# Patient Record
Sex: MALE | Race: WHITE | Employment: OTHER
[De-identification: names, ages, dates, MRNs, and addresses within clinical notes are randomized per-mention and may not be internally consistent; named-entity substitution may affect disease eponyms.]

---

## 2022-04-30 ENCOUNTER — NURSE TRIAGE (OUTPATIENT)
Dept: OTHER | Facility: CLINIC | Age: 72
End: 2022-04-30

## 2022-04-30 NOTE — TELEPHONE ENCOUNTER
Received call from [facility]  PHILIP at [facility] with [facility]. Subjective: Caller states \"SOB\"     Current Symptoms: Patient reported that he been told he \"breathes heavy\" for several months. Just got back home from 4 months in [facility]. Now is noticing increased SOB. States is able to ride his bike but gets SOB with walking or climbing stairs. Put over 800 miles on bike in the past 4 months without dyspnea. Onset: several months ago; gradual    Associated Symptoms: NA    Pain Severity: 0/10; ;     Temperature: denies     What has been tried: nothing    LMP: NA Pregnant: NA    Recommended disposition: See PCP within 3 Days    Care advice provided, patient verbalizes understanding; denies any other questions or concerns; instructed to call back for any new or worsening symptoms. Patient/Caller agrees with recommended disposition; writer provided warm transfer to [facility] at [facility] for appointment scheduling     Attention Provider: Thank you for allowing me to participate in the care of your patient. The patient was connected to triage in response to information provided to the ECC/PSC. Please do not respond through this encounter as the response is not directed to a shared pool.           Reason for Disposition   [1] MODERATE longstanding difficulty breathing (e.g., speaks in phrases, SOB even at rest, pulse 100-120) AND [2] SAME as normal    Protocols used: BREATHING DIFFICULTY-ADULT-AH

## 2022-05-03 ENCOUNTER — TELEPHONE (OUTPATIENT)
Dept: FAMILY MEDICINE CLINIC | Age: 72
End: 2022-05-03

## 2022-05-03 NOTE — TELEPHONE ENCOUNTER
----- Message from Mingo Landa sent at 4/30/2022 12:37 PM EDT -----  Subject: Message to Provider    QUESTIONS  Information for Provider? Patient is calling because he would like to   become a new patient to Dr. Fawn Flowers. No available appointment with   provider. Please contact patient. Please advise  ---------------------------------------------------------------------------  --------------  CALL BACK INFO  What is the best way for the office to contact you? OK to leave message on   voicemail  Preferred Call Back Phone Number? [phone]  ---------------------------------------------------------------------------  --------------  SCRIPT ANSWERS  Relationship to Patient?  Self

## 2022-05-25 ENCOUNTER — NURSE TRIAGE (OUTPATIENT)
Dept: OTHER | Facility: CLINIC | Age: 72
End: 2022-05-25

## 2022-05-25 NOTE — TELEPHONE ENCOUNTER
Received call from [facility] at [facility]; Patient with Red Flag Complaint requesting to establish care with [address] with Dr. Mele Rios. Subjective: Caller states \"Patient states people have been telling me I have been breathing heavy. Patient states recent difficulty walking up stairs. Patient states he went to the [facility] about a month ago, states they ran a bunch of tests, saw Cardiology, they wanted to admit, patient did not want to be admitted. States he was tested sleep apnea, does have sleep apnea and waiting on CPAP\". Also noted, dx with Afib during ED visit. Current Symptoms:  Intermittent SOB, occurs with walking > than a half a mile. Patient denies any current CP, SOB, or difficulty breathing. Onset: 1 year ago; gradual; worsening over the past year    Associated Symptoms: difficulty sleeping due sleep apne    Pain Severity: Denies any pain     Temperature: NA Denies feeling feverish    What has been tried: BP meds, eliquis (not taking), cholesterol    LMP: NA Pregnant: NA    Recommended disposition: Go to Office Now    Care advice provided, patient verbalizes understanding; denies any other questions or concerns; instructed to call back for any new or worsening symptoms. Patient/Caller agrees with recommended disposition; writer provided warm transfer to [place] at [facility] for appointment scheduling    Attention Provider: Thank you for allowing me to participate in the care of your patient. The patient was connected to triage in response to information provided to the [facility]. Please do not respond through this encounter as the response is not directed to a shared pool.       Reason for Disposition   Patient wants to be seen    Protocols used: BREATHING DIFFICULTY-ADULT-OH

## 2022-06-03 ENCOUNTER — OFFICE VISIT (OUTPATIENT)
Dept: INTERNAL MEDICINE CLINIC | Age: 72
End: 2022-06-03
Payer: COMMERCIAL

## 2022-06-03 VITALS
DIASTOLIC BLOOD PRESSURE: 82 MMHG | BODY MASS INDEX: 31.97 KG/M2 | HEIGHT: 72 IN | WEIGHT: 236 LBS | SYSTOLIC BLOOD PRESSURE: 120 MMHG

## 2022-06-03 DIAGNOSIS — G47.33 OSA (OBSTRUCTIVE SLEEP APNEA): ICD-10-CM

## 2022-06-03 DIAGNOSIS — I48.0 PAROXYSMAL ATRIAL FIBRILLATION (HCC): ICD-10-CM

## 2022-06-03 DIAGNOSIS — I27.20 PULMONARY HYPERTENSION (HCC): ICD-10-CM

## 2022-06-03 DIAGNOSIS — I10 PRIMARY HYPERTENSION: Primary | ICD-10-CM

## 2022-06-03 PROCEDURE — 99204 OFFICE O/P NEW MOD 45 MIN: CPT | Performed by: INTERNAL MEDICINE

## 2022-06-03 PROCEDURE — 1123F ACP DISCUSS/DSCN MKR DOCD: CPT | Performed by: INTERNAL MEDICINE

## 2022-06-03 RX ORDER — ATORVASTATIN CALCIUM 20 MG/1
TABLET, FILM COATED ORAL
COMMUNITY
Start: 2022-05-03

## 2022-06-03 RX ORDER — FUROSEMIDE 40 MG/1
40 TABLET ORAL
Qty: 12 TABLET | Refills: 3 | Status: SHIPPED | OUTPATIENT
Start: 2022-06-03 | End: 2022-07-03

## 2022-06-03 RX ORDER — LOSARTAN POTASSIUM 100 MG/1
TABLET ORAL
COMMUNITY
Start: 2021-12-08

## 2022-06-03 RX ORDER — METOPROLOL SUCCINATE 25 MG/1
25 TABLET, EXTENDED RELEASE ORAL DAILY
COMMUNITY
Start: 2022-05-24

## 2022-06-03 SDOH — ECONOMIC STABILITY: FOOD INSECURITY: WITHIN THE PAST 12 MONTHS, THE FOOD YOU BOUGHT JUST DIDN'T LAST AND YOU DIDN'T HAVE MONEY TO GET MORE.: NEVER TRUE

## 2022-06-03 SDOH — ECONOMIC STABILITY: FOOD INSECURITY: WITHIN THE PAST 12 MONTHS, YOU WORRIED THAT YOUR FOOD WOULD RUN OUT BEFORE YOU GOT MONEY TO BUY MORE.: NEVER TRUE

## 2022-06-03 SDOH — ECONOMIC STABILITY: TRANSPORTATION INSECURITY
IN THE PAST 12 MONTHS, HAS LACK OF TRANSPORTATION KEPT YOU FROM MEETINGS, WORK, OR FROM GETTING THINGS NEEDED FOR DAILY LIVING?: NO

## 2022-06-03 SDOH — ECONOMIC STABILITY: TRANSPORTATION INSECURITY
IN THE PAST 12 MONTHS, HAS THE LACK OF TRANSPORTATION KEPT YOU FROM MEDICAL APPOINTMENTS OR FROM GETTING MEDICATIONS?: NO

## 2022-06-03 ASSESSMENT — SOCIAL DETERMINANTS OF HEALTH (SDOH): HOW HARD IS IT FOR YOU TO PAY FOR THE VERY BASICS LIKE FOOD, HOUSING, MEDICAL CARE, AND HEATING?: NOT VERY HARD

## 2022-06-03 ASSESSMENT — PATIENT HEALTH QUESTIONNAIRE - PHQ9
SUM OF ALL RESPONSES TO PHQ QUESTIONS 1-9: 0
2. FEELING DOWN, DEPRESSED OR HOPELESS: 0
SUM OF ALL RESPONSES TO PHQ QUESTIONS 1-9: 0
SUM OF ALL RESPONSES TO PHQ9 QUESTIONS 1 & 2: 0
1. LITTLE INTEREST OR PLEASURE IN DOING THINGS: 0
SUM OF ALL RESPONSES TO PHQ QUESTIONS 1-9: 0
SUM OF ALL RESPONSES TO PHQ QUESTIONS 1-9: 0

## 2022-06-03 NOTE — PROGRESS NOTES
Subjective:      Patient ID: Padmini Veliz is a 70 y.o. male. The patient states that he was recently seen at [place] in [facility] . Presented with atrial fibrillation with rapid ventricular response but subsequent work-up showed that echocardiogram was normal he was started on metoprolol and Eliquis. His main complaint has been over the past 2 years dyspnea on exertion to be noted that he was found to have normal left ventricular size and function. He also had sleep study done which showed evidence of obstructive sleep apnea. He is a non-smoker and it is my impression that he has a pulmonary hypertension secondary to obstructive sleep apnea and that is causing dyspnea on exertion. Review of Systems   Constitutional: Positive for fatigue. HENT: Negative. Eyes: Negative. Respiratory: Positive for shortness of breath. Cardiovascular: Positive for palpitations. Gastrointestinal: Negative. Endocrine: Negative. Genitourinary: Negative. Musculoskeletal: Negative. Skin: Negative. Allergic/Immunologic: Negative. Hematological: Negative. Psychiatric/Behavioral: Negative. Objective:   Physical Exam  Constitutional:       Appearance: Normal appearance. Neck:      Vascular: No carotid bruit. Cardiovascular:      Rate and Rhythm: Normal rate and regular rhythm. Heart sounds: Murmur heard. Comments: A few years ago he was found to have dilated aorta and low intensity diastolic murmur suggesting of aortic regurgitation  Lymphadenopathy:      Cervical: No cervical adenopathy. Neurological:      General: No focal deficit present. Mental Status: He is alert and oriented to person, place, and time. Psychiatric:         Mood and Affect: Mood normal.         Behavior: Behavior normal.         Thought Content: Thought content normal.         Judgment: Judgment normal.         Assessment / Plan:       Diagnosis Orders   1.  Primary hypertension -blood pressure is well controlled with losartan and beta-blockers    2. Paroxysmal atrial fibrillation (HCC) = present he is in normal sinus rhythm      3. JORGE (obstructive sleep apnea) = is my impression that his dyspnea is secondary to obstructive sleep apnea he was advised CPAP. 1 impression at he is volume expanded he was given loop diuretic 40 mg 3 times per week and I will recheck him in 4 weeks furosemide (LASIX) 40 MG tablet    DME Order for CPAP as OP   4. Pulmonary hypertension ([address]     Records from [facility] in [place] were reviewed showed normal left ventricular ejection fraction and size and there was no evidence of left ventricular strain it is to be noted that this is a transthoracic echocardiogram and there was no complete evaluation of his valves  Return in about 4 weeks (around 7/1/2022) for Follow Up. Orders Placed This Encounter   Procedures    DME Order for CPAP as OP     {CHP DME Discharge  DX JORGE    Heated Humidifier    Heated Tubing with Integrated Element 1 per 3 months    Nasal Mask 1 per 3 months and Cushions/Seals 2 per month    Headgear 1 per 6 months, Chin Strap 1 per 6 months, Disposable Filters 2 per month, Non-disposable Filters 1 per 6 months, Chambers 1 per 6 months and Other Related Supplies. Replace supplies and accessories as needed. Patient may choose another interface for compliance and comfort. Comments: Symptomatic with dyspnea  Diagnosis: Obstructive sleep apnea  Length of need: 12 Months     Orders Placed This Encounter   Medications    furosemide (LASIX) 40 MG tablet     Sig: Take 1 tablet by mouth three times a week     Dispense:  12 tablet     Refill:  3     Visit Information    Have you changed or started any medications since your last visit including any over-the-counter medicines, vitamins, or herbal medicines? no   Are you having any side effects from any of your medications? -  no  Have you stopped taking any of your medications?  Is so, why? -  no    Have you seen any other physician or provider since your last visit? Yes - Records Obtained  Have you had any other diagnostic tests since your last visit? Yes - Records Obtained  Have you been seen in the emergency room and/or had an admission to a hospital since we last saw you? Yes - Records Obtained  Have you had your routine dental cleaning in the past 6 months? no    Have you activated your [employer] account? If not, what are your barriers?  No:      Patient Care Team:  Radha Santillan MD as PCP - General (Internal Medicine)    Medical History Review  Past Medical, Family, and Social History reviewed and does not contribute to the patient presenting condition    Health Maintenance   Topic Date Due    COVID-19 Vaccine (1) Never done    Hepatitis C screen  Never done    DTaP/Tdap/Td vaccine (1 - Tdap) Never done    Colorectal Cancer Screen  Never done    Shingles vaccine (1 of 2) Never done    Flu vaccine (Season Ended) 09/01/2022    Lipids  05/26/2023    Depression Screen  06/03/2023    Pneumococcal 65+ years Vaccine  Completed    Hepatitis A vaccine  Aged Out    Hepatitis B vaccine  Aged Out    Hib vaccine  Aged Out    Meningococcal (ACWY) vaccine  Aged Out

## 2022-06-04 ASSESSMENT — ENCOUNTER SYMPTOMS
ALLERGIC/IMMUNOLOGIC NEGATIVE: 1
SHORTNESS OF BREATH: 1
EYES NEGATIVE: 1
GASTROINTESTINAL NEGATIVE: 1

## 2022-12-25 ENCOUNTER — HOSPITAL ENCOUNTER (OUTPATIENT)
Age: 72
Setting detail: OBSERVATION
LOS: 1 days | Discharge: HOME | DRG: 189 | End: 2022-12-27
Attending: EMERGENCY MEDICINE | Admitting: STUDENT IN AN ORGANIZED HEALTH CARE EDUCATION/TRAINING PROGRAM
Payer: MEDICARE

## 2022-12-25 DIAGNOSIS — I50.9 CONGESTIVE HEART FAILURE, UNSPECIFIED HF CHRONICITY, UNSPECIFIED HEART FAILURE TYPE (HCC): Primary | ICD-10-CM

## 2022-12-25 LAB
ABSOLUTE EOS #: 0.1 K/UL (ref 0–0.4)
ABSOLUTE LYMPH #: 0.9 K/UL (ref 1–4.8)
ABSOLUTE MONO #: 0.7 K/UL (ref 0.1–1.2)
BASOPHILS # BLD: 0 % (ref 0–2)
BASOPHILS ABSOLUTE: 0 K/UL (ref 0–0.2)
EOSINOPHILS RELATIVE PERCENT: 1 % (ref 1–4)
HCT VFR BLD CALC: 38.8 % (ref 41–53)
HEMOGLOBIN: 12.9 G/DL (ref 13.5–17.5)
LYMPHOCYTES # BLD: 12 % (ref 24–44)
MCH RBC QN AUTO: 29.2 PG (ref 26–34)
MCHC RBC AUTO-ENTMCNC: 33.4 G/DL (ref 31–37)
MCV RBC AUTO: 87.5 FL (ref 80–100)
MONOCYTES # BLD: 9 % (ref 2–11)
PDW BLD-RTO: 13 % (ref 12.5–15.4)
PLATELET # BLD: 166 K/UL (ref 140–450)
PMV BLD AUTO: 7.7 FL (ref 6–12)
RBC # BLD: 4.43 M/UL (ref 4.5–5.9)
SEG NEUTROPHILS: 78 % (ref 36–66)
SEGMENTED NEUTROPHILS ABSOLUTE COUNT: 6 K/UL (ref 1.8–7.7)
WBC # BLD: 7.8 K/UL (ref 3.5–11)

## 2022-12-25 PROCEDURE — 93005 ELECTROCARDIOGRAM TRACING: CPT | Performed by: EMERGENCY MEDICINE

## 2022-12-25 PROCEDURE — 85025 COMPLETE CBC W/AUTO DIFF WBC: CPT

## 2022-12-25 PROCEDURE — 99285 EMERGENCY DEPT VISIT HI MDM: CPT

## 2022-12-25 PROCEDURE — 83880 ASSAY OF NATRIURETIC PEPTIDE: CPT

## 2022-12-25 PROCEDURE — 96375 TX/PRO/DX INJ NEW DRUG ADDON: CPT

## 2022-12-25 PROCEDURE — 84484 ASSAY OF TROPONIN QUANT: CPT

## 2022-12-25 PROCEDURE — 96374 THER/PROPH/DIAG INJ IV PUSH: CPT

## 2022-12-25 PROCEDURE — 36415 COLL VENOUS BLD VENIPUNCTURE: CPT

## 2022-12-25 PROCEDURE — 80048 BASIC METABOLIC PNL TOTAL CA: CPT

## 2022-12-25 ASSESSMENT — PAIN - FUNCTIONAL ASSESSMENT: PAIN_FUNCTIONAL_ASSESSMENT: NONE - DENIES PAIN

## 2022-12-26 ENCOUNTER — APPOINTMENT (OUTPATIENT)
Dept: CT IMAGING | Age: 72
DRG: 189 | End: 2022-12-26
Payer: MEDICARE

## 2022-12-26 PROBLEM — R09.02 HYPOXIA: Status: ACTIVE | Noted: 2022-12-26

## 2022-12-26 LAB
ANION GAP SERPL CALCULATED.3IONS-SCNC: 11 MMOL/L (ref 9–17)
ANION GAP SERPL CALCULATED.3IONS-SCNC: 11 MMOL/L (ref 9–17)
BACTERIA: ABNORMAL
BILIRUBIN URINE: NEGATIVE
BUN BLDV-MCNC: 15 MG/DL (ref 8–23)
BUN BLDV-MCNC: 16 MG/DL (ref 8–23)
CALCIUM SERPL-MCNC: 8.8 MG/DL (ref 8.6–10.4)
CALCIUM SERPL-MCNC: 9 MG/DL (ref 8.6–10.4)
CHLORIDE BLD-SCNC: 103 MMOL/L (ref 98–107)
CHLORIDE BLD-SCNC: 104 MMOL/L (ref 98–107)
CO2: 22 MMOL/L (ref 20–31)
CO2: 25 MMOL/L (ref 20–31)
COLOR: YELLOW
CREAT SERPL-MCNC: 0.8 MG/DL (ref 0.7–1.2)
CREAT SERPL-MCNC: 0.81 MG/DL (ref 0.7–1.2)
EKG ATRIAL RATE: 127 BPM
EKG Q-T INTERVAL: 324 MS
EKG QRS DURATION: 102 MS
EKG QTC CALCULATION (BAZETT): 430 MS
EKG R AXIS: -15 DEGREES
EKG T AXIS: 57 DEGREES
EKG VENTRICULAR RATE: 106 BPM
EPITHELIAL CELLS UA: ABNORMAL /HPF (ref 0–5)
FLU A ANTIGEN: NEGATIVE
FLU B ANTIGEN: NEGATIVE
GFR SERPL CREATININE-BSD FRML MDRD: >60 ML/MIN/1.73M2
GFR SERPL CREATININE-BSD FRML MDRD: >60 ML/MIN/1.73M2
GLUCOSE BLD-MCNC: 104 MG/DL (ref 70–99)
GLUCOSE BLD-MCNC: 120 MG/DL (ref 70–99)
GLUCOSE URINE: NEGATIVE
KETONES, URINE: NEGATIVE
LEUKOCYTE ESTERASE, URINE: NEGATIVE
MAGNESIUM: 2 MG/DL (ref 1.6–2.6)
NITRITE, URINE: NEGATIVE
PH UA: 6 (ref 5–8)
POTASSIUM SERPL-SCNC: 4.1 MMOL/L (ref 3.7–5.3)
POTASSIUM SERPL-SCNC: 4.3 MMOL/L (ref 3.7–5.3)
PRO-BNP: 1988 PG/ML
PROTEIN UA: NEGATIVE
RBC UA: ABNORMAL /HPF (ref 0–2)
SARS-COV-2, RAPID: NOT DETECTED
SODIUM BLD-SCNC: 137 MMOL/L (ref 135–144)
SODIUM BLD-SCNC: 139 MMOL/L (ref 135–144)
SPECIFIC GRAVITY UA: 1.02 (ref 1–1.03)
SPECIMEN DESCRIPTION: NORMAL
TROPONIN, HIGH SENSITIVITY: 22 NG/L (ref 0–22)
TROPONIN, HIGH SENSITIVITY: 23 NG/L (ref 0–22)
TROPONIN, HIGH SENSITIVITY: 24 NG/L (ref 0–22)
TURBIDITY: CLEAR
URINE HGB: NEGATIVE
UROBILINOGEN, URINE: NORMAL
WBC UA: ABNORMAL /HPF (ref 0–5)

## 2022-12-26 PROCEDURE — 2580000003 HC RX 258: Performed by: NURSE PRACTITIONER

## 2022-12-26 PROCEDURE — 6360000004 HC RX CONTRAST MEDICATION: Performed by: EMERGENCY MEDICINE

## 2022-12-26 PROCEDURE — 99222 1ST HOSP IP/OBS MODERATE 55: CPT | Performed by: HOSPITALIST

## 2022-12-26 PROCEDURE — 6370000000 HC RX 637 (ALT 250 FOR IP): Performed by: NURSE PRACTITIONER

## 2022-12-26 PROCEDURE — 80048 BASIC METABOLIC PNL TOTAL CA: CPT

## 2022-12-26 PROCEDURE — 87804 INFLUENZA ASSAY W/OPTIC: CPT

## 2022-12-26 PROCEDURE — 2580000003 HC RX 258: Performed by: EMERGENCY MEDICINE

## 2022-12-26 PROCEDURE — 87635 SARS-COV-2 COVID-19 AMP PRB: CPT

## 2022-12-26 PROCEDURE — 94760 N-INVAS EAR/PLS OXIMETRY 1: CPT

## 2022-12-26 PROCEDURE — 97116 GAIT TRAINING THERAPY: CPT

## 2022-12-26 PROCEDURE — 83735 ASSAY OF MAGNESIUM: CPT

## 2022-12-26 PROCEDURE — 2060000000 HC ICU INTERMEDIATE R&B

## 2022-12-26 PROCEDURE — 96374 THER/PROPH/DIAG INJ IV PUSH: CPT

## 2022-12-26 PROCEDURE — 97535 SELF CARE MNGMENT TRAINING: CPT

## 2022-12-26 PROCEDURE — 36415 COLL VENOUS BLD VENIPUNCTURE: CPT

## 2022-12-26 PROCEDURE — 96376 TX/PRO/DX INJ SAME DRUG ADON: CPT

## 2022-12-26 PROCEDURE — 6360000002 HC RX W HCPCS: Performed by: EMERGENCY MEDICINE

## 2022-12-26 PROCEDURE — 97165 OT EVAL LOW COMPLEX 30 MIN: CPT

## 2022-12-26 PROCEDURE — 71260 CT THORAX DX C+: CPT | Performed by: EMERGENCY MEDICINE

## 2022-12-26 PROCEDURE — 81001 URINALYSIS AUTO W/SCOPE: CPT

## 2022-12-26 PROCEDURE — 97161 PT EVAL LOW COMPLEX 20 MIN: CPT

## 2022-12-26 PROCEDURE — 2700000000 HC OXYGEN THERAPY PER DAY

## 2022-12-26 PROCEDURE — 2500000003 HC RX 250 WO HCPCS: Performed by: EMERGENCY MEDICINE

## 2022-12-26 PROCEDURE — 84484 ASSAY OF TROPONIN QUANT: CPT

## 2022-12-26 PROCEDURE — 6360000002 HC RX W HCPCS: Performed by: NURSE PRACTITIONER

## 2022-12-26 PROCEDURE — 96375 TX/PRO/DX INJ NEW DRUG ADDON: CPT

## 2022-12-26 RX ORDER — FUROSEMIDE 10 MG/ML
40 INJECTION INTRAMUSCULAR; INTRAVENOUS ONCE
Status: COMPLETED | OUTPATIENT
Start: 2022-12-26 | End: 2022-12-26

## 2022-12-26 RX ORDER — ACETAMINOPHEN 325 MG/1
650 TABLET ORAL EVERY 6 HOURS PRN
Status: DISCONTINUED | OUTPATIENT
Start: 2022-12-26 | End: 2022-12-27 | Stop reason: HOSPADM

## 2022-12-26 RX ORDER — ONDANSETRON 2 MG/ML
4 INJECTION INTRAMUSCULAR; INTRAVENOUS EVERY 6 HOURS PRN
Status: DISCONTINUED | OUTPATIENT
Start: 2022-12-26 | End: 2022-12-27 | Stop reason: HOSPADM

## 2022-12-26 RX ORDER — LISINOPRIL 5 MG/1
5 TABLET ORAL DAILY
Status: DISCONTINUED | OUTPATIENT
Start: 2022-12-27 | End: 2022-12-26 | Stop reason: SDUPTHER

## 2022-12-26 RX ORDER — ATORVASTATIN CALCIUM 10 MG/1
20 TABLET, FILM COATED ORAL DAILY
Status: DISCONTINUED | OUTPATIENT
Start: 2022-12-26 | End: 2022-12-27 | Stop reason: HOSPADM

## 2022-12-26 RX ORDER — ONDANSETRON 4 MG/1
4 TABLET, ORALLY DISINTEGRATING ORAL EVERY 8 HOURS PRN
Status: DISCONTINUED | OUTPATIENT
Start: 2022-12-26 | End: 2022-12-27 | Stop reason: HOSPADM

## 2022-12-26 RX ORDER — 0.9 % SODIUM CHLORIDE 0.9 %
80 INTRAVENOUS SOLUTION INTRAVENOUS ONCE
Status: DISCONTINUED | OUTPATIENT
Start: 2022-12-26 | End: 2022-12-27 | Stop reason: HOSPADM

## 2022-12-26 RX ORDER — METOPROLOL SUCCINATE 25 MG/1
25 TABLET, EXTENDED RELEASE ORAL DAILY
Status: DISCONTINUED | OUTPATIENT
Start: 2022-12-26 | End: 2022-12-27 | Stop reason: HOSPADM

## 2022-12-26 RX ORDER — FUROSEMIDE 10 MG/ML
40 INJECTION INTRAMUSCULAR; INTRAVENOUS 2 TIMES DAILY
Status: DISCONTINUED | OUTPATIENT
Start: 2022-12-26 | End: 2022-12-27 | Stop reason: HOSPADM

## 2022-12-26 RX ORDER — SODIUM CHLORIDE 0.9 % (FLUSH) 0.9 %
10 SYRINGE (ML) INJECTION PRN
Status: DISCONTINUED | OUTPATIENT
Start: 2022-12-26 | End: 2022-12-27 | Stop reason: HOSPADM

## 2022-12-26 RX ORDER — SODIUM CHLORIDE 9 MG/ML
INJECTION, SOLUTION INTRAVENOUS PRN
Status: DISCONTINUED | OUTPATIENT
Start: 2022-12-26 | End: 2022-12-27 | Stop reason: HOSPADM

## 2022-12-26 RX ORDER — POLYETHYLENE GLYCOL 3350 17 G/17G
17 POWDER, FOR SOLUTION ORAL DAILY PRN
Status: DISCONTINUED | OUTPATIENT
Start: 2022-12-26 | End: 2022-12-27 | Stop reason: HOSPADM

## 2022-12-26 RX ORDER — LOSARTAN POTASSIUM 50 MG/1
100 TABLET ORAL DAILY
Status: DISCONTINUED | OUTPATIENT
Start: 2022-12-26 | End: 2022-12-27 | Stop reason: HOSPADM

## 2022-12-26 RX ORDER — METOPROLOL TARTRATE 5 MG/5ML
5 INJECTION INTRAVENOUS ONCE
Status: COMPLETED | OUTPATIENT
Start: 2022-12-26 | End: 2022-12-26

## 2022-12-26 RX ORDER — MAGNESIUM SULFATE IN WATER 40 MG/ML
2000 INJECTION, SOLUTION INTRAVENOUS PRN
Status: DISCONTINUED | OUTPATIENT
Start: 2022-12-26 | End: 2022-12-27 | Stop reason: HOSPADM

## 2022-12-26 RX ORDER — POTASSIUM CHLORIDE 7.45 MG/ML
10 INJECTION INTRAVENOUS PRN
Status: DISCONTINUED | OUTPATIENT
Start: 2022-12-26 | End: 2022-12-27 | Stop reason: HOSPADM

## 2022-12-26 RX ORDER — SODIUM CHLORIDE 0.9 % (FLUSH) 0.9 %
5-40 SYRINGE (ML) INJECTION EVERY 12 HOURS SCHEDULED
Status: DISCONTINUED | OUTPATIENT
Start: 2022-12-26 | End: 2022-12-27 | Stop reason: HOSPADM

## 2022-12-26 RX ORDER — ENOXAPARIN SODIUM 100 MG/ML
30 INJECTION SUBCUTANEOUS 2 TIMES DAILY
Status: DISCONTINUED | OUTPATIENT
Start: 2022-12-26 | End: 2022-12-26 | Stop reason: SDUPTHER

## 2022-12-26 RX ORDER — POTASSIUM CHLORIDE 20 MEQ/1
40 TABLET, EXTENDED RELEASE ORAL PRN
Status: DISCONTINUED | OUTPATIENT
Start: 2022-12-26 | End: 2022-12-27 | Stop reason: HOSPADM

## 2022-12-26 RX ADMIN — METOPROLOL TARTRATE 5 MG: 5 INJECTION, SOLUTION INTRAVENOUS at 01:29

## 2022-12-26 RX ADMIN — IOPAMIDOL 75 ML: 755 INJECTION, SOLUTION INTRAVENOUS at 00:30

## 2022-12-26 RX ADMIN — METOPROLOL SUCCINATE 25 MG: 25 TABLET, EXTENDED RELEASE ORAL at 09:32

## 2022-12-26 RX ADMIN — SODIUM CHLORIDE, PRESERVATIVE FREE 10 ML: 5 INJECTION INTRAVENOUS at 00:30

## 2022-12-26 RX ADMIN — FUROSEMIDE 40 MG: 10 INJECTION, SOLUTION INTRAMUSCULAR; INTRAVENOUS at 16:30

## 2022-12-26 RX ADMIN — APIXABAN 5 MG: 5 TABLET, FILM COATED ORAL at 09:32

## 2022-12-26 RX ADMIN — ATORVASTATIN CALCIUM 20 MG: 10 TABLET, FILM COATED ORAL at 09:32

## 2022-12-26 RX ADMIN — APIXABAN 5 MG: 5 TABLET, FILM COATED ORAL at 22:20

## 2022-12-26 RX ADMIN — SODIUM CHLORIDE, PRESERVATIVE FREE 10 ML: 5 INJECTION INTRAVENOUS at 09:32

## 2022-12-26 RX ADMIN — LOSARTAN POTASSIUM 100 MG: 50 TABLET, FILM COATED ORAL at 09:32

## 2022-12-26 RX ADMIN — FUROSEMIDE 40 MG: 10 INJECTION, SOLUTION INTRAMUSCULAR; INTRAVENOUS at 01:29

## 2022-12-26 RX ADMIN — Medication 80 ML: at 00:23

## 2022-12-26 RX ADMIN — FUROSEMIDE 40 MG: 10 INJECTION, SOLUTION INTRAMUSCULAR; INTRAVENOUS at 09:32

## 2022-12-26 RX ADMIN — SODIUM CHLORIDE, PRESERVATIVE FREE 10 ML: 5 INJECTION INTRAVENOUS at 22:20

## 2022-12-26 ASSESSMENT — ENCOUNTER SYMPTOMS
CONSTIPATION: 0
ABDOMINAL PAIN: 0
NAUSEA: 0
SORE THROAT: 0
SHORTNESS OF BREATH: 1
PHOTOPHOBIA: 0
DIARRHEA: 0
RHINORRHEA: 0
CHEST TIGHTNESS: 0
BACK PAIN: 0
COUGH: 0
VOMITING: 0

## 2022-12-26 NOTE — CARE COORDINATION
Case Management Assessment  Initial Evaluation    Date/Time of Evaluation: 12/26/2022 11:26 AM  Assessment Completed by: Emma Crespo RN    If patient is discharged prior to next notation, then this note serves as note for discharge by case management. Patient Name: Gela Bartholomew                   YOB: 1950  Diagnosis: Hypoxia [R09.02]  Congestive heart failure, unspecified HF chronicity, unspecified heart failure type [facility]) [I50.9]                   Date / Time: 12/25/2022 10:23 PM    Patient Admission Status: Inpatient   Readmission Risk (Low < 19, Mod (19-27), High > 27): Readmission Risk Score: 5.8    Current PCP: Colonel Sujata MD  PCP verified by CM? Yes    Chart Reviewed: Yes      History Provided by: Patient  Patient Orientation: Alert and Oriented    Patient Cognition: Alert    Hospitalization in the last 30 days (Readmission):  No    If yes, Readmission Assessment in CM Navigator will be completed. Advance Directives:      Code Status: Full Code   Patient's Primary Decision Maker is: Legal Next of Kin    Primary Decision Maker: Cali Wooten - Spouse - [phone]    Discharge Planning:    Patient lives with: Spouse/Significant Other Type of Home: House  Primary Care Giver:    Patient Support Systems include: Spouse/Significant Other   Current Financial resources:    Current community resources:    Current services prior to admission: C-pap            Current DME:              Type of Home Care services:  None    ADLS  Prior functional level: Independent in ADLs/IADLs  Current functional level: Independent in ADLs/IADLs    PT AM-PAC:   /24  OT AM-PAC:   /24    Family can provide assistance at DC: Would you like Case Management to discuss the discharge plan with any other family members/significant others, and if so, who?     Plans to Return to Present Housing: Yes  Other Identified Issues/Barriers to RETURNING to current housing: NA  Potential Assistance needed at discharge: N/A            Potential DME:    Patient expects to discharge to: [facility] for transportation at discharge: Family    Financial    Payor: Samantha Junior / Plan: MEDICARE PART A AND B / Product Type: *No Product type* /     Does insurance require precert for SNF: No    Potential assistance Purchasing Medications:    Meds-to-Beds request:        [address], [place] - [address] [facility] [phone]  [address] [facility] [address]  Phone: [phone] Fax: [phone]      Notes:    Factors facilitating achievement of predicted outcomes:     Barriers to discharge: Additional Case Management Notes: d/c home independent    The Plan for Transition of Care is related to the following treatment goals of Hypoxia [R09.02]  Congestive heart failure, unspecified HF chronicity, unspecified heart failure type ([address] [P63.3]    IF APPLICABLE: The Patient and/or patient representative [place] Parents and his family were provided with a choice of provider and agrees with the discharge plan. Freedom of choice list with basic dialogue that supports the patient's individualized plan of care/goals and shares the quality data associated with the providers was provided to: Patient   Patient Representative Name:       The Patient and/or Patient Representative Agree with the Discharge Plan?  Yes    Vandana Willard RN  Case Management Department  Ph: [phone] Fax: [phone]

## 2022-12-26 NOTE — PROGRESS NOTES
Physical Therapy  Facility/Department: [facility] ICU  Physical Therapy Initial Assessment    Name: Bennie Laguna  : 1950  MRN: [id number]  Date of Service: 2022  Chief Complaint   Patient presents with    Shortness of Breath       Discharge Recommendations:  No therapy recommended at discharge   PT Equipment Recommendations  Equipment Needed: No      Patient Diagnosis(es): The encounter diagnosis was Congestive heart failure, unspecified HF chronicity, unspecified heart failure type ([address] Past Medical History:  has a past medical history of Hyperlipidemia and Hypertension. Past Surgical History:  has a past surgical history that includes knee surgery; Prostate surgery; Dental surgery; and Tonsillectomy. Assessment   Assessment: Discharge from acute care PT- pt at baseline functional level. Pt able to ambulate a functional distance supervision- cues for breathing techniques occasionally during mobility as pt tends of hold his breath but otherwise able to carryover to tasks and no further assistance required. Pt without the need for skilled acute care PT or any further PT upon discharge.   Therapy Prognosis: Good  Decision Making: Low Complexity  Requires PT Follow-Up: Yes  Activity Tolerance  Activity Tolerance: Patient tolerated treatment well;Patient tolerated evaluation without incident     Plan   Physcial Therapy Plan  General Plan:  (Discharge from acute care PT- pt at baseline functional level)  Safety Devices  Type of Devices: Call light within reach, Left in chair, Nurse notified  Restraints  Restraints Initially in Place: No     Restrictions  Restrictions/Precautions  Restrictions/Precautions: General Precautions, Up as Tolerated  Required Braces or Orthoses?: No     Subjective   General  Patient assessed for rehabilitation services?: Yes  Response To Previous Treatment: Not applicable  Family / Caregiver Present: Yes (wife)  Follows Commands: Within Functional Limits  Subjective  Subjective: Pt supine in bed upon arrival; agreeable to therapy. Pt denies any pain. States he is starting to feel better. Social/Functional History  Social/Functional History  Lives With: Spouse  Type of Home: House  Home Layout: One level  Home Access: Stairs to enter without rails  Entrance Stairs - Number of Steps: 1  Bathroom Shower/Tub: Walk-in shower  Bathroom Toilet: Handicap height  Bathroom Equipment:  (None currently- but notes he plans to install grab bars by the toilet)  Home Equipment:  (CPAP machine; otherwise no DME)  Has the patient had two or more falls in the past year or any fall with injury in the past year?: No  ADL Assistance: [address]: Independent  Homemaking Responsibilities: Yes  Other (Comment): Shares most household chores; pt does majority of the outside work  Ambulation Assistance: Independent  Transfer Assistance: Independent  Active : Yes  Mode of Transportation: Car, Truck  Occupation: Retired  Leisure & Hobbies: Enjoys riding bikes and going to yoga 3x per week, golf  Additional Comments: Spouse is retired and able to assist if needed.   Vision/Hearing  Vision  Vision: Impaired  Vision Exceptions: Wears glasses at all times  Hearing  Hearing: Exceptions to [facility]  Hearing Exceptions: Bilateral hearing aid    Cognition   Orientation  Overall Orientation Status: Within Functional Limits  Cognition  Overall Cognitive Status: WFL     Objective         Gross Assessment  Sensation: Intact (Pt denies any numbness or tingling)     AROM RLE (degrees)  RLE AROM: WFL  AROM LLE (degrees)  LLE AROM : WFL  AROM RUE (degrees)  RUE AROM : WFL  AROM LUE (degrees)  LUE AROM : WFL  Strength RLE  Strength RLE: WNL  Comment: 5/5  Strength LLE  Strength LLE: WNL  Comment: 5/5  Strength RUE  Comment: Co evaluation with OT-see OT evaluation for full UE assessment  Strength LUE  Comment: Co evaluation with OT-see OT evaluation for full UE assessment Bed mobility  Supine to Sit: Independent (HOB flat to mimic home setup)  Sit to Supine:  (Retired up to chair)  Scooting: Independent  Transfers  Sit to Stand: Independent  Stand to Sit: Independent  Ambulation  Surface: Level tile  Device: No Device  Assistance: Supervision  Quality of Gait: noted increased work of breathing with gait but grossly steady without any LOBs or notable gait deviations.  Oxygen saturation on room air throughout all mobility remained 92-95%  Gait Deviations: None  Distance: 300ft  More Ambulation?: No     Balance  Posture: Good  Sitting - Static: Good  Sitting - Dynamic: Good  Standing - Static: Good  Standing - Dynamic: Good  Comments: standing balance assessed without device           AM-PAC Score  AM-PAC Inpatient Mobility Raw Score : 24 (12/26/22 1346)  AM-PAC Inpatient T-Scale Score : 61.14 (12/26/22 1346)  Mobility Inpatient CMS 0-100% Score: 0 (12/26/22 1346)  Mobility Inpatient CMS G-Code Modifier : [address] (12/26/22 1346)        Goals  Short Term Goals  Time Frame for Short Term Goals: Discharge from acute care PT- pt at baseline functional level       Education  Patient Education  Education Given To: Patient  Education Provided: Role of Therapy;Plan of Care  Education Provided Comments: breathing techniques  Education Method: Verbal  Barriers to Learning: None  Education Outcome: Verbalized understanding;Demonstrated understanding      Therapy Time   Individual Concurrent Group Co-treatment   Time In 1032         Time Out 1054         Minutes 22         Timed Code Treatment Minutes: [address] [facility],

## 2022-12-26 NOTE — PROGRESS NOTES
Occupational Therapy  Facility/Department: [facility] [address] [place] [address]  Occupational Therapy Initial Assessment    Name: Jose Rivera  : 1950  MRN: [id number]  Date of Service: 2022    Chief Complaint   Patient presents with    Shortness of Breath     Discharge Recommendations:  No therapy recommended at discharge. Patient Diagnosis(es): The encounter diagnosis was Congestive heart failure, unspecified HF chronicity, unspecified heart failure type ([address] Past Medical History:  has a past medical history of Hyperlipidemia and Hypertension. Past Surgical History:  has a past surgical history that includes knee surgery; Prostate surgery; Dental surgery; and Tonsillectomy. Assessment   Assessment: Pt demo ability to perform ADL tasks and functional transfers/functional mobility independently this date on room air this date with SpO2 maintained above 92%. Pt with noted increased work of breathing during functional task performance; pt educated on energy conservation and pursed lip breathing techniques, pt verbalized understanding of these techniques and demo good understanding. Pt with no further OT acute care needs at this time, OT to sign off. Pt expected to be safe to return home at discharge with good family support available. Prognosis: Good  Decision Making: Low Complexity  REQUIRES OT FOLLOW-UP: No  Activity Tolerance  Activity Tolerance: Patient Tolerated treatment well      Safety Devices  Type of Devices: Call light within reach; Left in chair;Nurse notified  Restraints  Restraints Initially in Place: No    Restrictions  Restrictions/Precautions  Restrictions/Precautions: General Precautions, Up as Tolerated  Required Braces or Orthoses?: No    Subjective   General  Patient assessed for rehabilitation services?: Yes  Family / Caregiver Present: Yes (wife)  General Comment  Comments: RN ok'd for therapy this AM. Pt agreeable to participate in session and pleasant/cooperative throughout. Pt denies any pain/numbness/tingling. Social/Functional History  Social/Functional History  Lives With: Spouse  Type of Home: House  Home Layout: One level  Home Access: Stairs to enter without rails  Entrance Stairs - Number of Steps: 1  Bathroom Shower/Tub: Walk-in shower  Bathroom Toilet: Handicap height  Bathroom Equipment:  (None currently- but notes he plans to install grab bars by the toilet)  Home Equipment:  (CPAP machine; otherwise no DME)  Has the patient had two or more falls in the past year or any fall with injury in the past year?: No  ADL Assistance: [facility] [address]: Independent  Homemaking Responsibilities: Yes  Other (Comment): Shares most household chores; pt does majority of the outside work  Ambulation Assistance: Independent  Transfer Assistance: Independent  Active : Yes  Mode of Transportation: Car, Truck  Occupation: Retired  Leisure & Hobbies: Enjoys riding bikes and going to yoga 3x per week, golf  Additional Comments: Spouse is retired and able to assist if needed. Objective   Balance  Sitting: IND  Standing: SUP (pt demo ~6-7 minutes tolerance to standing/mobility tasks during toileting tasks standing to void, hand hygiene at sink side, and functional mobility within hospital room/hallway)    Functional Mobility  Pt performed functional mobility within hospital room/hallway and to/from bathroom without use of AD and on room air. Pt steady throughout with no LOB. SpO2 maintained above 92% throughout. Overall Level of Assistance: Supervision  Interventions: Verbal cues (min VCs for breathing techniques as pt intermittently noted to hold breath)    Toilet Transfers  Toilet - Technique: Ambulating  Equipment Used: Standard toilet  Toilet Transfer: Supervision    AROM: Within functional limits (BUE)  Strength:  Within functional limits (BUE grossly 5/5)  Coordination: Within functional limits (BUE FMC/GMC)    ADL  Feeding: Independent  Grooming: Supervision (standing sink side to perform hand hygiene)  UE Bathing: Supervision  LE Bathing: Supervision  UE Dressing: Supervision  LE Dressing: Supervision (seated EOB to doff/don socks utilizing figure four technique)  Toileting: Supervision (standing at toilet to void)  Comments: Pt educated in energy conservation- pacing/rest breaks/breathing techniques as pt noted with increased work of breathing following exertion and pt demo good return of this education during ADL performance.         Bed mobility  Supine to Sit: Independent (HOB flat to mimic home setup)  Sit to Supine:  (Retired up to chair)  Scooting: Independent    Transfers  Sit to stand: Supervision  Stand to sit: Supervision    Vision  Vision: Impaired  Vision Exceptions: Wears glasses at all times  Hearing  Hearing: Exceptions to [facility]  Hearing Exceptions: Bilateral hearing aid    Cognition  Overall Cognitive Status: WFL  Orientation  Overall Orientation Status: Within Functional Limits        Education Given To: Patient  Education Provided: Role of Therapy;Plan of Care (Activity Promotion, Safety Awareness/Fall Prevention, Energy Conservation/Pursed Lip Breathing Techniques)  Education Method: Verbal  Barriers to Learning: None  Education Outcome: Verbalized understanding;Demonstrated understanding       AM-PAC Score   AM-[facility] Inpatient Daily Activity Raw Score: 24 (12/26/22 1404)  AM-PAC Inpatient ADL T-Scale Score : 57.54 (12/26/22 1404)  ADL Inpatient CMS 0-100% Score: 0 (12/26/22 1404)  ADL Inpatient CMS G-Code Modifier : CH (12/26/22 1404)    Therapy Time   Individual Concurrent Group Co-treatment   Time In 3270         Time Out 1054         Minutes 22         Timed Code Treatment Minutes: 8 Minutes       Diana Segovia, OTR/L

## 2022-12-26 NOTE — ED NOTES
[employer] Text to:    Alex Juarez NP   Patient:   Neela Goodwin     YOB: 1950  MRN:   [id number]  Location: [place] [address]   12/26/22 1:38 AM  [address] [place] [address] or Facility: [facility] [place] NEW ADMISSION From: Dr Jase Ponce RE: Aleshia España: Andrea Fraser to [address] [place] [address], CINDY  12/26/22 7763

## 2022-12-26 NOTE — PLAN OF CARE
Problem: Discharge Planning  Goal: Discharge to home or other facility with appropriate resources  12/26/2022 1605 by Ashvin Randolph RN  Outcome: Progressing  Flowsheets (Taken 12/26/2022 0800)  Discharge to home or other facility with appropriate resources: Identify barriers to discharge with patient and caregiver  12/26/2022 0259 by Chadwick Valle RN  Outcome: Progressing   Patients questions and concerns addressed and answered.

## 2022-12-26 NOTE — H&P
[facility]  Office: [address], DO, Bebeto Patella, DO, Elda Prey, DO, Xiao Sawyeres Blood, DO, Shellie Wang MD, Sheeba Snow MD, Carroll Porter MD, Deirdre Bingham MD,  Zuleyma Laws MD, Steff Mckeon MD, Dionisio Ocampo, DO, Kristi Koo MD,  Anum Narayan MD, Faina Dukes MD, Alex Lewis, DO, Margaux Mace MD, Giuseppe Chavez MD, Lianne Liu, DO, Kristin Chapman MD, Thuan Kc MD, Rodney Ledesma MD, Sabi Gallego MD, Miki Cruz, DO, Samantha Beckett MD, Vilma Block MD, Carmela Rossi, CNP,  Fredi Piper, CNP, Amee Nickerson, CNP, Merwyn Cooks, CNP,  Chiara Dior, DNP, Yumiko Covarrubias, CNP, Santosh Rizvi, CNP, Vinay Durant, CNP, Bar Rhodes, CNP, Demetri Jack, CNP, Eulalio Gillette PAJaimeC, Karen Gunderson, CNS, Natalee Guerin, CNP, Maikel Hancock, CNP         [address] [facility]    HISTORY AND PHYSICAL EXAMINATION            Date:   12/26/2022  Patient name:  Temitope Stevens  Date of admission:  12/25/2022 10:23 PM  MRN:   [id number]  Account:  [de-identified]  YOB: 1950  PCP:    Kayla Coon MD  Room:   [address] [place] [address]  Code Status:    Full Code    Chief Complaint:     Chief Complaint   Patient presents with    Shortness of Breath     Patient presents the hospital with worsening shortness of breath, dyspnea with exertion. Patient states \"I just cannot catch my breath\"    History Obtained From:     patient, electronic medical record    History of Present Illness:     Temitope Stevens is a 67 y.o. Non- / non  male who presents with Shortness of Breath   and is admitted to the hospital for the management of Hypoxia. This very pleasant 55-year-old male who is visiting from [facility] presented to the hospital with shortness of breath. Given his recent travel he did undergo CT scanning of the chest to rule out pulmonary embolism.   CT scan did not show pulmonary embolism. He has evidence of COPD with pulmonary hyperinflation. There is mild pulmonary vascular congestion. Reviewing records within Care Everywhere shows that back in June the patient did undergo stress test along with echocardiogram.  He has preserved ejection fraction and no evidence of ischemic changes. He recently underwent a sleep study through a \"[facility] through [facility] pharmacy. He was found to have obstructive sleep apnea and was sent to a CPAP machine. This occurred in January of this year however he tells me that he has not been wearing his CPAP as it just does not feel well. He has all the classic signs and symptoms of obstructive sleep apnea including daytime sleepiness, drowsiness. He is looking to undergo placement of a Inspire device to treat his sleep apnea. At this point in time his work-up while in the hospital shows an elevated BNP and with the pulmonary vascular congestion he has been started on diuretics. Majority of his symptomology appears to be secondary to his untreated sleep apnea. He also has atrial fibrillation which she has been noncompliant with his Eliquis. He also apparently was given a \"puffer\" which she has not been utilizing. I have a very long discussion with him regarding obstructive sleep apnea and how it plays a role with atrial fibrillation. We also discussed what happens to the right ventricle with increased pressures within the pneumonic system. I do suspect a majority of his symptoms are secondary to his obstructive sleep apnea and noncompliance with CPAP. As mentioned he underwent stress echocardiogram along with nuclear stress test in May and June of this year. Both of these were unremarkable. At this point in time we will continue to diurese him and wean oxygen as tolerated. I did review his [id number] score with him and after explaining his risk of stroke he is agreeable to taking Eliquis.   He does have Eliquis at home but as mentioned has not been taking it. He also understands the importance of follow-up with pulmonology in either implantation of a Inspire device versus compliance with CPAP. He denies any questions or concerns and appreciates all the information. Past Medical History:     Past Medical History:   Diagnosis Date    Hyperlipidemia     Hypertension         Past Surgical History:     Past Surgical History:   Procedure Laterality Date    DENTAL SURGERY      KNEE SURGERY      PROSTATE SURGERY      TONSILLECTOMY          Medications Prior to Admission:     Prior to Admission medications    Medication Sig Start Date End Date Taking? Authorizing Provider   atorvastatin (LIPITOR) 20 MG tablet Take one pill once a day for cholesterol lowering 5/3/22   Historical Provider, MD   losartan (COZAAR) 100 MG tablet TAKE 1 TABLET BY MOUTH EVERY DAY FOR HYPERTENSION 12/8/21   Historical Provider, MD   metoprolol succinate (TOPROL XL) 25 MG extended release tablet Take 25 mg by mouth daily 5/24/22   Historical Provider, MD        Allergies:     Patient has no known allergies. Social History:     Tobacco:    reports that he has never smoked. He has never used smokeless tobacco.  Alcohol:      reports current alcohol use. Drug Use:  reports no history of drug use. Family History:     History reviewed. No pertinent family history. Review of Systems:     Positive and Negative as described in HPI. CONSTITUTIONAL:  negative for fevers, chills, sweats, fatigue, weight loss  HEENT:  negative for vision, hearing changes, runny nose, throat pain  RESPIRATORY: Positive for shortness of breath, worse with exertion.   Patient denies any orthopnea or paroxysmal nocturnal dyspnea  CARDIOVASCULAR:  negative for chest pain, palpitations  GASTROINTESTINAL:  negative for nausea, vomiting, diarrhea, constipation, change in bowel habits, abdominal pain   GENITOURINARY:  negative for difficulty of urination, burning with urination, frequency   INTEGUMENT: negative for rash, skin lesions, easy bruising   HEMATOLOGIC/LYMPHATIC:  negative for swelling/edema   ALLERGIC/IMMUNOLOGIC:  negative for urticaria , itching  ENDOCRINE:  negative increase in drinking, increase in urination, hot or cold intolerance  MUSCULOSKELETAL:  negative joint pains, muscle aches, swelling of joints  NEUROLOGICAL:  negative for headaches, dizziness, lightheadedness, numbness, pain, tingling extremities  BEHAVIOR/PSYCH:  negative for depression, anxiety    Physical Exam:   BP (!) 135/95   Pulse 83   Temp 97.9 °F (36.6 °C) (Oral)   Resp 20   Ht 6' (1.829 m)   Wt 240 lb (108.9 kg)   SpO2 95%   BMI 32.55 kg/m²   Temp (24hrs), Av °F (36.7 °C), Min:97.9 °F (36.6 °C), Max:98.3 °F (36.8 °C)    No results for input(s): POCGLU in the last 72 hours. Intake/Output Summary (Last 24 hours) at 2022 1005  Last data filed at 2022 0315  Gross per 24 hour   Intake --   Output 1150 ml   Net -1150 ml       General Appearance:  alert, well appearing, and in no acute distress  Mental status: oriented to person, place, and time  Head:  normocephalic, atraumatic  Eye: no icterus, redness, pupils equal and reactive, extraocular eye movements intact, conjunctiva clear  Ear: normal external ear, no discharge, hearing intact  Nose:  no drainage noted  Mouth: mucous membranes moist  Neck: supple, no carotid bruits, thyroid not palpable  Lungs: Faint crackles at the bases bilaterally otherwise clear to auscultation  Cardiovascular: Irregular rhythm, rate controlled.   No murmurs, gallops, rubs  Abdomen: Soft, nontender, nondistended, normal bowel sounds, no hepatomegaly or splenomegaly  Neurologic: There are no new focal motor or sensory deficits, normal muscle tone and bulk, no abnormal sensation, normal speech, cranial nerves II through XII grossly intact  Skin: No gross lesions, rashes, bruising or bleeding on exposed skin area  Extremities:  peripheral pulses palpable, no pedal edema or calf pain with palpation  Psych: normal affect     Investigations:      Laboratory Testing:  Recent Results (from the past 24 hour(s))   EKG 12 Lead    Collection Time: 12/25/22 10:26 PM   Result Value Ref Range    Ventricular Rate 106 BPM    Atrial Rate 127 BPM    QRS Duration 102 ms    Q-T Interval 324 ms    QTc Calculation (Bazett) 430 ms    R Axis -15 degrees    T Axis 57 degrees   Basic Metabolic Panel    Collection Time: 12/25/22 11:30 PM   Result Value Ref Range    Glucose 120 (H) 70 - 99 mg/dL    BUN 16 8 - 23 mg/dL    Creatinine 0.81 0.70 - 1.20 mg/dL    Est, Glom Filt Rate >60 >60 mL/min/1.73m2    Calcium 8.8 8.6 - 10.4 mg/dL    Sodium 137 135 - 144 mmol/L    Potassium 4.3 3.7 - 5.3 mmol/L    Chloride 104 98 - 107 mmol/L    CO2 22 20 - 31 mmol/L    Anion Gap 11 9 - 17 mmol/L   CBC with Auto Differential    Collection Time: 12/25/22 11:30 PM   Result Value Ref Range    WBC 7.8 3.5 - 11.0 k/uL    RBC 4.43 (L) 4.5 - 5.9 m/uL    Hemoglobin 12.9 (L) 13.5 - 17.5 g/dL    Hematocrit 38.8 (L) 41 - 53 %    MCV 87.5 80 - 100 fL    MCH 29.2 26 - 34 pg    MCHC 33.4 31 - 37 g/dL    RDW 13.0 12.5 - 15.4 %    Platelets 454 672 - 613 k/uL    MPV 7.7 6.0 - 12.0 fL    Seg Neutrophils 78 (H) 36 - 66 %    Lymphocytes 12 (L) 24 - 44 %    Monocytes 9 2 - 11 %    Eosinophils % 1 1 - 4 %    Basophils 0 0 - 2 %    Segs Absolute 6.00 1.8 - 7.7 k/uL    Absolute Lymph # 0.90 (L) 1.0 - 4.8 k/uL    Absolute Mono # 0.70 0.1 - 1.2 k/uL    Absolute Eos # 0.10 0.0 - 0.4 k/uL    Basophils Absolute 0.00 0.0 - 0.2 k/uL   Troponin    Collection Time: 12/25/22 11:30 PM   Result Value Ref Range    Troponin, High Sensitivity 23 (H) 0 - 22 ng/L   Brain Natriuretic Peptide    Collection Time: 12/25/22 11:30 PM   Result Value Ref Range    Pro-BNP 1,988 (H) <300 pg/mL   Urinalysis with Microscopic    Collection Time: 12/26/22  1:20 AM   Result Value Ref Range    Color, UA Yellow Yellow    Turbidity UA Clear Clear    Glucose, Ur NEGATIVE NEGATIVE Bilirubin Urine NEGATIVE NEGATIVE    Ketones, Urine NEGATIVE NEGATIVE    Specific Gravity, UA 1.020 1.005 - 1.030    Urine Hgb NEGATIVE NEGATIVE    pH, UA 6.0 5.0 - 8.0    Protein, UA NEGATIVE NEGATIVE    Urobilinogen, Urine Normal Normal    Nitrite, Urine NEGATIVE NEGATIVE    Leukocyte Esterase, Urine NEGATIVE NEGATIVE    WBC, UA 0 TO 2 0 - 5 /HPF    RBC, UA None 0 - 2 /HPF    Epithelial Cells UA 0 TO 2 0 - 5 /HPF    Bacteria, UA FEW (A) None   Troponin    Collection Time: 12/26/22  1:56 AM   Result Value Ref Range    Troponin, High Sensitivity 24 (H) 0 - 22 ng/L   Rapid Influenza A/B Antigens    Collection Time: 12/26/22  2:07 AM    Specimen: Nasopharyngeal   Result Value Ref Range    Flu A Antigen NEGATIVE NEGATIVE    Flu B Antigen NEGATIVE NEGATIVE   COVID-19, Rapid    Collection Time: 12/26/22  2:07 AM    Specimen: Nasopharyngeal Swab   Result Value Ref Range    Specimen Description . NASOPHARYNGEAL SWAB     SARS-CoV-2, Rapid Not Detected Not Detected   Basic Metabolic Panel    Collection Time: 12/26/22  5:34 AM   Result Value Ref Range    Glucose 104 (H) 70 - 99 mg/dL    BUN 15 8 - 23 mg/dL    Creatinine 0.80 0.70 - 1.20 mg/dL    Est, Glom Filt Rate >60 >60 mL/min/1.73m2    Calcium 9.0 8.6 - 10.4 mg/dL    Sodium 139 135 - 144 mmol/L    Potassium 4.1 3.7 - 5.3 mmol/L    Chloride 103 98 - 107 mmol/L    CO2 25 20 - 31 mmol/L    Anion Gap 11 9 - 17 mmol/L   Magnesium    Collection Time: 12/26/22  5:34 AM   Result Value Ref Range    Magnesium 2.0 1.6 - 2.6 mg/dL   Troponin    Collection Time: 12/26/22  5:34 AM   Result Value Ref Range    Troponin, High Sensitivity 22 0 - 22 ng/L       Imaging/Diagnostics:  CT CHEST PULMONARY EMBOLISM W CONTRAST    Result Date: 12/26/2022  No evidence of pulmonary embolism. Thoracic aorta is not optimally opacified due to timing of contrast.  In the partially opacified ascending thoracic aorta there is no grossly definable aneurysm or dissection.  Evidence of at least mild COPD with mild pulmonary hyperinflation on AP diameter. No obvious bullous change in lungs. At the posterior aspects of bilateral upper and lower lobes there are dependent atelectatic changes but pre-existing mild-to-moderate interstitial fibrotic changes may not be excluded. Mild pulmonary vascular congestion. Minimal interstitial pulmonary edema is not excluded. There is no definable confluent pneumonia or confluent atelectasis in lungs. Small right pleural effusion. Minimal left pleural effusion. Assessment :      Hospital Problems             Last Modified POA    * (Principal) Hypoxia 12/26/2022 Yes       Plan:     Patient status inpatient in the Med/Surge    Shortness of breath/vascular congestion  Patient's echocardiogram does not demonstrate systolic nor diastolic dysfunction, vascular congestion likely secondary to his sleep apnea and noncompliance  Continue IV diuretics  Acute hypoxic respiratory failure  Wean oxygen as tolerated, patient may require home oxygen evaluation  Noncompliance/obstructive sleep apnea  Had a very long discussion with the patient regarding obstructive sleep apnea and its long-term complications. Strongly encourage patient to utilize CPAP  Strongly encouraged patient to follow-up with pulmonology to discuss his desire for an Inspire device  Chronic atrial fibrillation  Long discussion with the patient regarding his risk of stroke associated with atrial fibrillation as well as the development of atrial fibrillation associated with obstructive sleep apnea  Continue metoprolol  Patient agreeable to taking Eliquis  Essential hypertension  Continue Cozaar/metoprolol  Dyslipidemia  Continue statin    Continue to diurese over the next 24 hours, continue to wean oxygen as tolerated.   Hopefully patient can be discharged home on low-dose diuretic in the next 24 to 48 hours with outpatient follow-up with pulmonology    Consultations:   [address] 68 TO DIETITIAN    Patient is admitted as inpatient status because of co-morbidities listed above, severity of signs and symptoms as outlined, requirement for current medical therapies and most importantly because of direct risk to patient if care not provided in a hospital setting. Expected length of stay > 48 hours.     Maria Teresa Coles DO  12/26/2022  10:05 AM    Copy sent to Dr. Karla Gibbs MD

## 2022-12-26 NOTE — ED PROVIDER NOTES
[address] [facility] Emergency Department  [address] [facility] [address] [facility] [address]  Phone: [phone]  Fax: [phone]        Pt Name: Sis Vance  MRN: [id number]  [place] 1950  Date of evaluation: 12/26/22      CHIEF COMPLAINT     Chief Complaint   Patient presents with    Shortness of Breath         HISTORY OF PRESENT ILLNESS  (Location/Symptom, Timing/Onset, Context/Setting, Quality, Duration, Modifying Factors, Severity.)    Sis Vance is a 67 y.o. male who presents dyspnea on exertion. Patient states that he has been feeling short of breath for about a year now. He states that he has seen cardiology, was diagnosed with atrial flutter with rapid ventricular response. He had a's stress test which was negative in June 2022. He has also had an echocardiogram at that time showed showed a hyperdynamic systolic function with an EF of 70%. Patient has a known history of sleep apnea and is noncompliant with his CPAP. His wife states that he was recently prescribed an inhaler by his primary care doctor, but he has not been using it. She states that he breathes heavily all the time. The patient states that he feels that his shortness of breath is progressively worsening. He has no known history of coronary artery disease. He does have hypertension, hyperlipidemia. No known family history of heart disease. He is a non-smoker. No prior history of venous thromboembolism. He did recently travel here from [facility] on December 14 via car. No leg swelling. No hemoptysis. No history of malignancy. He denies chest pain. No nausea, vomiting, diarrhea. No nasal congestion, sore throat, or cough. No fever or chills. REVIEW OF SYSTEMS    (2-9 systems for level 4, 10 or more for level 5)     Review of Systems   Constitutional:  Negative for chills and fever. HENT:  Negative for congestion, rhinorrhea and sore throat.     Eyes:  Negative for photophobia and visual disturbance. Respiratory:  Positive for shortness of breath. Negative for cough and chest tightness. Cardiovascular:  Negative for chest pain, palpitations and leg swelling. Gastrointestinal:  Negative for abdominal pain, constipation, diarrhea, nausea and vomiting. Genitourinary:  Negative for dysuria, frequency and urgency. Musculoskeletal:  Negative for back pain and neck pain. Skin:  Negative for rash and wound. Neurological:  Negative for dizziness, light-headedness and headaches. Hematological:  Negative for adenopathy. Does not bruise/bleed easily. PAST MEDICAL HISTORY    has a past medical history of Hyperlipidemia and Hypertension. SURGICAL HISTORY      has a past surgical history that includes knee surgery; Prostate surgery; Dental surgery; and Tonsillectomy. CURRENTMEDICATIONS       Previous Medications    ATORVASTATIN (LIPITOR) 20 MG TABLET    Take one pill once a day for cholesterol lowering    LOSARTAN (COZAAR) 100 MG TABLET    TAKE 1 TABLET BY MOUTH EVERY DAY FOR HYPERTENSION    METOPROLOL SUCCINATE (TOPROL XL) 25 MG EXTENDED RELEASE TABLET    Take 25 mg by mouth daily       ALLERGIES     has No Known Allergies. FAMILY HISTORY     has no family status information on file. family history is not on file. SOCIAL HISTORY      reports that he has never smoked. He has never used smokeless tobacco. He reports current alcohol use. He reports that he does not use drugs. PHYSICAL EXAM    (up to 7 for level 4, 8 or more for level 5)   INITIAL VITALS:  height is 6' (1.829 m) and weight is 108.9 kg (240 lb). His oral temperature is 98.3 °F (36.8 °C). His blood pressure is 155/107 (abnormal) and his pulse is 80. His respiration is 18 and oxygen saturation is 96%. Physical Exam  Vitals and nursing note reviewed. Constitutional:       Appearance: He is well-developed. He is obese. He is not ill-appearing. HENT:      Head: Normocephalic and atraumatic.    Eyes: Extraocular Movements: Extraocular movements intact. Pupils: Pupils are equal, round, and reactive to light. Cardiovascular:      Rate and Rhythm: Normal rate. Rhythm irregular. Heart sounds: No murmur heard. No friction rub. No gallop. Pulmonary:      Effort: Pulmonary effort is normal.      Breath sounds: Examination of the right-middle field reveals rhonchi. Examination of the left-middle field reveals rhonchi. Examination of the right-lower field reveals rhonchi. Examination of the left-lower field reveals rhonchi. Rhonchi present. No decreased breath sounds, wheezing or rales. Chest:      Chest wall: No tenderness. Musculoskeletal:         General: Normal range of motion. Cervical back: Normal range of motion and neck supple. Right lower leg: No tenderness. No edema. Left lower leg: No tenderness. No edema. Lymphadenopathy:      Cervical: No cervical adenopathy. Skin:     General: Skin is warm and dry. Capillary Refill: Capillary refill takes less than 2 seconds. Neurological:      General: No focal deficit present. Mental Status: He is alert and oriented to person, place, and time. Psychiatric:         Mood and Affect: Mood normal.         Behavior: Behavior normal.       DIFFERENTIAL DIAGNOSIS/ MDM:     51-year-old male here with shortness of breath and hypoxia. Work-up is in progress for the shortness of breath which has been an issue for the last year. Patient was supposed to follow-up with a pulmonologist in the office, but has not yet been able to get in for his appointment which is on December 30. In the emergency department today, the patient was found to have a mildly elevated BNP. He is in atrial fib, is a chronic issue for him. He is hypertensive. Plan for admission due to his hypoxia, congestive heart failure with pleural effusion, hypertensive urgency.     DIAGNOSTIC RESULTS     EKG: All EKG's are interpreted by the Emergency Department Physician who either signs or Co-signs this chart in the absence of a cardiologist.    EKG Interpretation    Interpreted by emergency department physician    Rhythm: atrial fibrillation - rapid  Rate: tachycardia  Axis: left  Ectopy: premature ventricular contractions (unifocal)  Conduction: atrial fib  ST Segments: nonspecific changes  T Waves: non specific changes  Q Waves: none    Clinical Impression: non-specific EKG and atrial fibrillation (chronic)    Veronica Easton MD      RADIOLOGY:        Interpretation per the Radiologist below, if available at the time of this note:    CT CHEST PULMONARY EMBOLISM W CONTRAST    Result Date: 12/26/2022  No evidence of pulmonary embolism. Thoracic aorta is not optimally opacified due to timing of contrast.  In the partially opacified ascending thoracic aorta there is no grossly definable aneurysm or dissection. Evidence of at least mild COPD with mild pulmonary hyperinflation on AP diameter. No obvious bullous change in lungs. At the posterior aspects of bilateral upper lower lobe there are dependent atelectatic changes but pre-existing mild-to-moderate interstitial fibrotic changes may not be excluded. Mild pulmonary vascular congestion. Minimal interstitial pulmonary edema is not excluded. There is no definable confluent pneumonia or confluent atelectasis in lungs. Small right pleural effusion. Minimal left pleural effusion. LABS:  Results for orders placed or performed during the hospital encounter of 12/25/22   Rapid Influenza A/B Antigens    Specimen: Nasopharyngeal   Result Value Ref Range    Flu A Antigen NEGATIVE NEGATIVE    Flu B Antigen NEGATIVE NEGATIVE   COVID-19, Rapid    Specimen: Nasopharyngeal Swab   Result Value Ref Range    Specimen Description . NASOPHARYNGEAL SWAB     SARS-CoV-2, Rapid Not Detected Not Detected   Basic Metabolic Panel   Result Value Ref Range    Glucose 120 (H) 70 - 99 mg/dL    BUN 16 8 - 23 mg/dL    Creatinine 0.81 0.70 - 1.20 mg/dL    Est, Glom Filt Rate >60 >60 mL/min/1.73m2    Calcium 8.8 8.6 - 10.4 mg/dL    Sodium 137 135 - 144 mmol/L    Potassium 4.3 3.7 - 5.3 mmol/L    Chloride 104 98 - 107 mmol/L    CO2 22 20 - 31 mmol/L    Anion Gap 11 9 - 17 mmol/L   CBC with Auto Differential   Result Value Ref Range    WBC 7.8 3.5 - 11.0 k/uL    RBC 4.43 (L) 4.5 - 5.9 m/uL    Hemoglobin 12.9 (L) 13.5 - 17.5 g/dL    Hematocrit 38.8 (L) 41 - 53 %    MCV 87.5 80 - 100 fL    MCH 29.2 26 - 34 pg    MCHC 33.4 31 - 37 g/dL    RDW 13.0 12.5 - 15.4 %    Platelets 478 260 - 172 k/uL    MPV 7.7 6.0 - 12.0 fL    Seg Neutrophils 78 (H) 36 - 66 %    Lymphocytes 12 (L) 24 - 44 %    Monocytes 9 2 - 11 %    Eosinophils % 1 1 - 4 %    Basophils 0 0 - 2 %    Segs Absolute 6.00 1.8 - 7.7 k/uL    Absolute Lymph # 0.90 (L) 1.0 - 4.8 k/uL    Absolute Mono # 0.70 0.1 - 1.2 k/uL    Absolute Eos # 0.10 0.0 - 0.4 k/uL    Basophils Absolute 0.00 0.0 - 0.2 k/uL   Troponin   Result Value Ref Range    Troponin, High Sensitivity 23 (H) 0 - 22 ng/L   Urinalysis with Microscopic   Result Value Ref Range    Color, UA Yellow Yellow    Turbidity UA Clear Clear    Glucose, Ur NEGATIVE NEGATIVE    Bilirubin Urine NEGATIVE NEGATIVE    Ketones, Urine NEGATIVE NEGATIVE    Specific Gravity, UA 1.020 1.005 - 1.030    Urine Hgb NEGATIVE NEGATIVE    pH, UA 6.0 5.0 - 8.0    Protein, UA NEGATIVE NEGATIVE    Urobilinogen, Urine Normal Normal    Nitrite, Urine NEGATIVE NEGATIVE    Leukocyte Esterase, Urine NEGATIVE NEGATIVE    WBC, UA 0 TO 2 0 - 5 /HPF    RBC, UA None 0 - 2 /HPF    Epithelial Cells UA 0 TO 2 0 - 5 /HPF    Bacteria, UA FEW (A) None   Brain Natriuretic Peptide   Result Value Ref Range    Pro-BNP 1,988 (H) <300 pg/mL   Troponin   Result Value Ref Range    Troponin, High Sensitivity 24 (H) 0 - 22 ng/L       Elevated BNP  Elevated troponin    EMERGENCY DEPARTMENT COURSE:   Vitals:    Vitals:    12/26/22 0000 12/26/22 0125 12/26/22 0127 12/26/22 0129   BP: (!) 152/120 (!) 155/107 (!) 155/107 (!) 155/107   Pulse: 90 85 80    Resp: 20 18     Temp:       TempSrc:       SpO2: 97% (!) 87% 96%    Weight:       Height:         -------------------------  BP: (!) 155/107, Temp: 98.3 °F (36.8 °C), Heart Rate: 80, Resp: 18    The patient was given the following medications:  Orders Placed This Encounter   Medications    0.9 % sodium chloride bolus    iopamidol (ISOVUE-370) 76 % injection 75 mL    sodium chloride flush 0.9 % injection 10 mL    furosemide (LASIX) injection 40 mg    metoprolol (LOPRESSOR) injection 5 mg    metoprolol succinate (TOPROL XL) extended release tablet 25 mg    apixaban (ELIQUIS) tablet 5 mg     Order Specific Question:   Indication of Use     Answer:   A Fib/A Flutter    sodium chloride flush 0.9 % injection 5-40 mL    sodium chloride flush 0.9 % injection 10 mL    0.9 % sodium chloride infusion    OR Linked Order Group     ondansetron (ZOFRAN-ODT) disintegrating tablet 4 mg     ondansetron (ZOFRAN) injection 4 mg    polyethylene glycol (GLYCOLAX) packet 17 g    OR Linked Order Group     acetaminophen (TYLENOL) tablet 650 mg     acetaminophen (TYLENOL) suppository 650 mg    DISCONTD: enoxaparin Sodium (LOVENOX) injection 30 mg     Order Specific Question:   Indication of Use     Answer:   Prophylaxis-DVT/PE    OR Linked Order Group     potassium chloride (KLOR-CON M) extended release tablet 40 mEq     potassium bicarb-citric acid (EFFER-K) effervescent tablet 40 mEq     potassium chloride 10 mEq/100 mL IVPB (Peripheral Line)    magnesium sulfate 2000 mg in 50 mL IVPB premix    DISCONTD: lisinopril (PRINIVIL;ZESTRIL) tablet 5 mg    atorvastatin (LIPITOR) tablet 20 mg    losartan (COZAAR) tablet 100 mg    furosemide (LASIX) injection 40 mg        RE-EVALUATION:  1:24 AM EST  The patient is resting comfortably. I updated the patient on test results and plan of care. The patient had an opportunity to ask questions, and all questions were answered.        CONSULTS:  Internal medicine PROCEDURES:  None    FINAL IMPRESSION      1. Congestive heart failure, unspecified HF chronicity, unspecified heart failure type ([facility] [address]          DISPOSITION/PLAN   DISPOSITION Admitted 12/26/2022 02:02:19 AM      CONDITION ON DISPOSITION:   fair    PATIENT REFERRED TO:  No follow-up provider specified.     DISCHARGE MEDICATIONS:  New Prescriptions    No medications on file       (Please note that portions of this note were completed with a voicerecognition program.  Efforts were made to edit the dictations but occasionally words are mis-transcribed.)    Nahun Barrett MD  Attending Emergency Medicine Physician       Nahun Barrett MD  12/26/22 1770

## 2022-12-27 VITALS
SYSTOLIC BLOOD PRESSURE: 128 MMHG | HEIGHT: 72 IN | HEART RATE: 80 BPM | OXYGEN SATURATION: 96 % | RESPIRATION RATE: 18 BRPM | DIASTOLIC BLOOD PRESSURE: 99 MMHG | TEMPERATURE: 98.2 F | WEIGHT: 240 LBS | BODY MASS INDEX: 32.51 KG/M2

## 2022-12-27 LAB
ANION GAP SERPL CALCULATED.3IONS-SCNC: 14 MMOL/L (ref 9–17)
BUN BLDV-MCNC: 20 MG/DL (ref 8–23)
CALCIUM SERPL-MCNC: 9.2 MG/DL (ref 8.6–10.4)
CHLORIDE BLD-SCNC: 100 MMOL/L (ref 98–107)
CHOLESTEROL/HDL RATIO: 3.4
CHOLESTEROL: 132 MG/DL
CO2: 23 MMOL/L (ref 20–31)
CREAT SERPL-MCNC: 0.77 MG/DL (ref 0.7–1.2)
GFR SERPL CREATININE-BSD FRML MDRD: >60 ML/MIN/1.73M2
GLUCOSE BLD-MCNC: 105 MG/DL (ref 70–99)
HDLC SERPL-MCNC: 39 MG/DL
LDL CHOLESTEROL: 77 MG/DL (ref 0–130)
MAGNESIUM: 2.1 MG/DL (ref 1.6–2.6)
POTASSIUM SERPL-SCNC: 4 MMOL/L (ref 3.7–5.3)
PRO-BNP: 1045 PG/ML
SODIUM BLD-SCNC: 137 MMOL/L (ref 135–144)
TRIGL SERPL-MCNC: 80 MG/DL
TSH SERPL DL<=0.05 MIU/L-ACNC: 1.13 UIU/ML (ref 0.3–5)

## 2022-12-27 PROCEDURE — 83880 ASSAY OF NATRIURETIC PEPTIDE: CPT

## 2022-12-27 PROCEDURE — 6370000000 HC RX 637 (ALT 250 FOR IP): Performed by: NURSE PRACTITIONER

## 2022-12-27 PROCEDURE — 80048 BASIC METABOLIC PNL TOTAL CA: CPT

## 2022-12-27 PROCEDURE — 2580000003 HC RX 258: Performed by: NURSE PRACTITIONER

## 2022-12-27 PROCEDURE — 96376 TX/PRO/DX INJ SAME DRUG ADON: CPT

## 2022-12-27 PROCEDURE — 84443 ASSAY THYROID STIM HORMONE: CPT

## 2022-12-27 PROCEDURE — 99217 PR OBSERVATION CARE DISCHARGE MANAGEMENT: CPT | Performed by: STUDENT IN AN ORGANIZED HEALTH CARE EDUCATION/TRAINING PROGRAM

## 2022-12-27 PROCEDURE — 36415 COLL VENOUS BLD VENIPUNCTURE: CPT

## 2022-12-27 PROCEDURE — 83735 ASSAY OF MAGNESIUM: CPT

## 2022-12-27 PROCEDURE — 94760 N-INVAS EAR/PLS OXIMETRY 1: CPT

## 2022-12-27 PROCEDURE — 2700000000 HC OXYGEN THERAPY PER DAY

## 2022-12-27 PROCEDURE — 80061 LIPID PANEL: CPT

## 2022-12-27 PROCEDURE — 6360000002 HC RX W HCPCS: Performed by: NURSE PRACTITIONER

## 2022-12-27 PROCEDURE — G0378 HOSPITAL OBSERVATION PER HR: HCPCS

## 2022-12-27 RX ORDER — FUROSEMIDE 40 MG/1
40 TABLET ORAL DAILY
Qty: 60 TABLET | Refills: 3 | Status: SHIPPED | OUTPATIENT
Start: 2022-12-27

## 2022-12-27 RX ADMIN — METOPROLOL SUCCINATE 25 MG: 25 TABLET, EXTENDED RELEASE ORAL at 08:28

## 2022-12-27 RX ADMIN — ATORVASTATIN CALCIUM 20 MG: 10 TABLET, FILM COATED ORAL at 08:28

## 2022-12-27 RX ADMIN — APIXABAN 5 MG: 5 TABLET, FILM COATED ORAL at 08:28

## 2022-12-27 RX ADMIN — SODIUM CHLORIDE, PRESERVATIVE FREE 10 ML: 5 INJECTION INTRAVENOUS at 08:29

## 2022-12-27 RX ADMIN — LOSARTAN POTASSIUM 100 MG: 50 TABLET, FILM COATED ORAL at 08:29

## 2022-12-27 RX ADMIN — FUROSEMIDE 40 MG: 10 INJECTION, SOLUTION INTRAMUSCULAR; INTRAVENOUS at 08:29

## 2022-12-27 NOTE — DISCHARGE SUMMARY
[facility]  Office: [address], DO, Tha Hartshorne, DO, Amarilis Beets, DO, Yeimi Carrasquillo Blood, DO, Nadeen Wei MD, Liyah Jeronimo MD, Johnnie Claude, MD, Rosie Card MD,  Izabela Ohara MD, Truett Burkitt, MD, Alex Flores, DO, Elsi Christine MD,  Dana Hollingsworth MD, Mario Grant MD, Susanna Mcghee, DO, Ethel Stone MD, Perla Craig MD, Daphne Green, DO, Marc Zepeda MD, Mary Ann Holley MD, Shira Puentes MD, Kari Hidalgo MD, Jeremy Iglesias, DO, Florian Bartholomew MD, Josefina Cat MD, Kris Toth, CNP,  Trinidad Hermosillo, CNP, Yue Shelton, CNP, Darnell Diaz, CNP,  Krunal Delvalle, [facility], Marylen Net, CNP, Otf Barrientos, CNP, Medina Byrd, CNP, Cris Wakefield, CNP, Anibal Mariee, CNP, Gwendolyn Garrett PA-C, Tiara Mejia, CNS, Mark Lew, CNP, Luciana Cope 5960    Discharge Summary     Patient ID: Darian Baker  :  1950   MRN: [id number]     ACCOUNT:  [de-identified]   Patient's PCP: Facundo Rutherford MD  Admit Date: 2022   Discharge Date: 2022     Length of Stay: 1  Code Status:  Full Code  Admitting Physician: Mario Grant MD  Discharge Physician: Mario Grant MD     Active Discharge Diagnoses:     Hospital Problem Lists:  Principal Problem:    Hypoxia  Active Problems:    Paroxysmal atrial fibrillation (HCC)    JORGE (obstructive sleep apnea)    Primary hypertension  Resolved Problems:    * No resolved hospital problems. *      Admission Condition:  fair     Discharged Condition: good    Hospital Stay:     Hospital Course:  Darian Baker is a 67 y.o. male with a past medical history of paroxsymal atrial fibrillation, obesity and sleep apnea who presented to the emergency department on 2022 complaining of shortness of breath. The patient states that his shortness of breath began about a year ago and has remained constant.  The patient is from [place] and states that he has an appointment with pulmonology there later this week. He states that he had an outpatient echocardiogram and stress test done earlier this year and reports that they were both within normal limits. He has sleep apnea but states that he has not been wearing his CPAP machine. In the ED, the patient was hypoxic and placed on 4 L nasal cannula. CT chest was done was remarkable for mild pulmonary vascular congestion. The patient was admitted to internal medicine for further management of acute hypoxia secondary to pulmonary edema (likely due to poorly controlled sleep apnea). The patient improved with IV diuresis and was weaned off of oxygen prior to discharge. The patient is discharged home today (12/27) in stable condition. He is instructed to take furosemide as prescribed and follow-up with pulmonology in [place] later this week as scheduled. Significant therapeutic interventions: IV diuresis; supplemental oxygen     Significant Diagnostic Studies:   Labs / Micro:  BMP:    Lab Results   Component Value Date/Time    GLUCOSE 105 12/27/2022 06:10 AM     12/27/2022 06:10 AM    K 4.0 12/27/2022 06:10 AM     12/27/2022 06:10 AM    CO2 23 12/27/2022 06:10 AM    ANIONGAP 14 12/27/2022 06:10 AM    BUN 20 12/27/2022 06:10 AM    CREATININE 0.77 12/27/2022 06:10 AM    CALCIUM 9.2 12/27/2022 06:10 AM    LABGLOM >60 12/27/2022 06:10 AM     Radiology:  CT CHEST PULMONARY EMBOLISM W CONTRAST    Result Date: 12/26/2022  No evidence of pulmonary embolism. Thoracic aorta is not optimally opacified due to timing of contrast.  In the partially opacified ascending thoracic aorta there is no grossly definable aneurysm or dissection. Evidence of at least mild COPD with mild pulmonary hyperinflation on AP diameter. No obvious bullous change in lungs.  At the posterior aspects of bilateral upper and lower lobes there are dependent atelectatic changes but pre-existing mild-to-moderate interstitial fibrotic changes may not be excluded. Mild pulmonary vascular congestion. Minimal interstitial pulmonary edema is not excluded. There is no definable confluent pneumonia or confluent atelectasis in lungs. Small right pleural effusion. Minimal left pleural effusion. Consultations:    Consults:     Final Specialist Recommendations/Findings:   IP CONSULT TO HEART FAILURE NURSE/COORDINATOR  IP CONSULT TO DIETITIAN      The patient was seen and examined on day of discharge and this discharge summary is in conjunction with any daily progress note from day of discharge. Discharge plan:     Disposition: Home    Physician Follow Up: Follow-up with pulmonology and your primary care physician as scheduled. Requiring Further Evaluation/Follow Up POST HOSPITALIZATION/Incidental Findings: None. Diet: cardiac diet    Activity: As tolerated    Instructions to Patient: Take Lasix as prescribed. Follow-up with pulmonology as scheduled. Start wearing your CPAP as recommended by pulmonology. Discharge Medications:      Medication List        CHANGE how you take these medications      furosemide 40 MG tablet  Commonly known as: Lasix  Take 1 tablet by mouth daily  What changed: when to take this            CONTINUE taking these medications      apixaban 5 MG Tabs tablet  Commonly known as: ELIQUIS  Take 1 tablet by mouth 2 times daily     atorvastatin 20 MG tablet  Commonly known as: LIPITOR     losartan 100 MG tablet  Commonly known as: COZAAR     metoprolol succinate 25 MG extended release tablet  Commonly known as: TOPROL XL               Where to Get Your Medications        These medications were sent to [address] [place] [address], [address] [place] [address], [address] [address]      Phone: [phone]   apixaban 5 MG Tabs tablet  furosemide 40 MG tablet         No discharge procedures on file.     Time Spent on discharge is  20 mins in patient examination, evaluation, counseling as well as medication reconciliation, prescriptions for required medications, discharge plan and follow up. Electronically signed by   Faina Dukes MD  12/27/2022  8:26 AM      Thank you Dr. Garza Officer, MD for the opportunity to be involved in this patient's care.

## 2022-12-27 NOTE — PLAN OF CARE
Problem: Discharge Planning  Goal: Discharge to home or other facility with appropriate resources  Outcome: Adequate for Discharge  Flowsheets (Taken 12/27/2022 0802)  Discharge to home or other facility with appropriate resources: Identify barriers to discharge with patient and caregiver

## 2022-12-27 NOTE — PROGRESS NOTES
Nutrition Note    Consult received. Nutrition Ed 12/27/22.     Electronically signed by Eva Coker RD on 12/26/22 at 7:31 PM EST    Contact: MARILEE Corbett, RDN, LD  Registered [address] [place] [address] 146  [phone]

## 2022-12-27 NOTE — PLAN OF CARE
Problem: Discharge Planning  Goal: Discharge to home or other facility with appropriate resources  12/27/2022 1018 by Deniece Dakins, RN  Outcome: Completed  12/27/2022 1017 by Deniece Dakins, RN  Outcome: Adequate for Discharge  Flowsheets (Taken 12/27/2022 0802)  Discharge to home or other facility with appropriate resources: Identify barriers to discharge with patient and caregiver

## 2022-12-28 ENCOUNTER — CARE COORDINATION (OUTPATIENT)
Dept: CASE MANAGEMENT | Age: 72
End: 2022-12-28

## 2022-12-28 NOTE — CARE COORDINATION
[facility] Care Transitions Initial Follow Up Call    Call within 2 business days of discharge: Yes    LPN Care Coordinator contacted the patient by telephone to perform post hospital discharge assessment. Verified name and  with patient as identifiers. Provided introduction to self, and explanation of the LPN Care Coordinator role. Patient: Hernan Dela Cruz Patient : 1950   MRN: [id number]  Reason for Admission: CHF  hypoxia  AFIB  Discharge Date: 22 RARS: Readmission Risk Score: 7      Last Discharge  Street       Date Complaint Diagnosis Description Type Department Provider    22 Shortness of Breath Congestive heart failure, unspecified HF chronicity, unspecified heart failure type [facility]) ED to Hosp-Admission (Discharged) (ADMITTED) PB PB MS Carrillo Casillas MD; Tami Shultz, ... Was this an external facility discharge? No Discharge Facility: N.A    Challenges to be reviewed by the provider   Additional needs identified to be addressed with provider: No  none               Method of communication with provider: none. Transitions of Care Initial Call: spoke to [employer] the role of Care Transition Nurse and the Transition program, patient is agreeable to follow up calls Post discharge from the [address] states he is currently driving back home to [facility]. Denies chest pain, palpitations  or dyspnea. No use of oxygen. Pt states his hospital admit was a result of not using his CPAP machine Appetite is good. Bowel and bladder WNL. Medication reconciliation completed. Picked up new meds Eliquis and lasix. Pt follows up with his own Dr on [facility]. Scheduled to see a Pulmonologist on 22. No new issues or concerns. Final call. Patient is aware of when to contact MD with any new or worsening symptoms. Advised to contact PCP  with any health concerns for early outpatient intervention in an effort to avoid hospitalization.   Report any worsening symptoms to PCP and/or Call 911 and/or GO TO EMERGENCY ROOM if symptoms are severe. Expresses understanding. [facility] Care Coordinator reviewed discharge instructions with patient who verbalized understanding. The patient was given an opportunity to ask questions and does not have any further questions or concerns at this time. Were discharge instructions available to patient? Yes. Reviewed appropriate site of care based on symptoms and resources available to patient including: PCP  Specialist  When to call 911. The patient agrees to contact the PCP office for questions related to their healthcare. Advance Care Planning:   Does patient have an Advance Directive: reviewed and current. Medication reconciliation was performed with patient, who verbalizes understanding of administration of home medications. Medications reviewed, 1111F entered: NO  non [facility] PCP    Was patient discharged with a pulse oximeter? yes, discussed and confirmed pulse oximeter discharge instructions and when to notify provider or seek emergency care. Non-face-to-face services provided:  Obtained and reviewed discharge summary and/or continuity of care documents    Offered patient enrollment in the Remote Patient Monitoring (RPM) program for in-home monitoring: NA.    Care Transitions 24 Hour Call    Do you have a copy of your discharge instructions?: Yes  Do you have all of your prescriptions and are they filled?: Yes  Have you been contacted by a [address]?: No  Have you scheduled your follow up appointment?: Yes  How are you going to get to your appointment?: Car - drive self  Do you feel like you have everything you need to keep you well at home?: Yes  Care Transitions Interventions  No Identified Needs         Follow Up  No future appointments. LPN Care Coordinator provided contact information. No further follow-up call indicated based on severity of symptoms and risk factors.   Plan for next call:  [address] [facility] [address] [place] [address], [facility]   Macario Valerio LPN Care Transitions Nurse   [phone]

## 2024-05-28 ENCOUNTER — TELEPHONE (OUTPATIENT)
Dept: INTERNAL MEDICINE CLINIC | Age: 74
End: 2024-05-28

## 2024-05-28 NOTE — TELEPHONE ENCOUNTER
Writer mailed pt a letter. Pts pcp is listed as Dr Neil and due to Dr Neil being on an extended leave of absence, pt willl need to establish care with one of the following providers:  MD Mustapha Delgadillo MD Vaishali Sinha, MD Bijender Kumar, MD

## 2024-07-03 ENCOUNTER — TELEPHONE (OUTPATIENT)
Dept: INTERNAL MEDICINE CLINIC | Age: 74
End: 2024-07-03

## 2024-07-03 NOTE — TELEPHONE ENCOUNTER
Writer called and left message for pt explaining that Dr Neil is on a leave and is no longer seeing pts at the practice and will need to switch to a new provider.